# Patient Record
Sex: FEMALE | Race: BLACK OR AFRICAN AMERICAN | NOT HISPANIC OR LATINO | Employment: OTHER | URBAN - METROPOLITAN AREA
[De-identification: names, ages, dates, MRNs, and addresses within clinical notes are randomized per-mention and may not be internally consistent; named-entity substitution may affect disease eponyms.]

---

## 2025-01-09 ENCOUNTER — OFFICE VISIT (OUTPATIENT)
Dept: URGENT CARE | Age: 65
End: 2025-01-09
Payer: MEDICAID

## 2025-01-09 VITALS
DIASTOLIC BLOOD PRESSURE: 82 MMHG | TEMPERATURE: 97.6 F | WEIGHT: 149 LBS | SYSTOLIC BLOOD PRESSURE: 150 MMHG | RESPIRATION RATE: 18 BRPM | BODY MASS INDEX: 25.44 KG/M2 | HEIGHT: 64 IN | OXYGEN SATURATION: 96 % | HEART RATE: 60 BPM

## 2025-01-09 DIAGNOSIS — R30.0 DYSURIA: ICD-10-CM

## 2025-01-09 DIAGNOSIS — N30.01 ACUTE CYSTITIS WITH HEMATURIA: Primary | ICD-10-CM

## 2025-01-09 DIAGNOSIS — H65.192 OTHER NON-RECURRENT ACUTE NONSUPPURATIVE OTITIS MEDIA OF LEFT EAR: ICD-10-CM

## 2025-01-09 PROBLEM — I48.20 CHRONIC ATRIAL FIBRILLATION (MULTI): Status: ACTIVE | Noted: 2022-06-10

## 2025-01-09 PROBLEM — E78.5 HYPERLIPIDEMIA: Chronic | Status: ACTIVE | Noted: 2024-10-18

## 2025-01-09 PROBLEM — I69.30 HISTORY OF STROKE WITH RESIDUAL DEFICIT: Status: ACTIVE | Noted: 2022-06-10

## 2025-01-09 PROBLEM — Z86.718 HISTORY OF DVT (DEEP VEIN THROMBOSIS): Status: ACTIVE | Noted: 2017-04-07

## 2025-01-09 PROBLEM — I50.32 CHRONIC DIASTOLIC CHF (CONGESTIVE HEART FAILURE): Status: ACTIVE | Noted: 2021-03-11

## 2025-01-09 PROBLEM — J44.9 CHRONIC OBSTRUCTIVE PULMONARY DISEASE, UNSPECIFIED: Status: ACTIVE | Noted: 2024-08-04

## 2025-01-09 PROBLEM — Z86.79 HISTORY OF ATRIAL FIBRILLATION: Status: ACTIVE | Noted: 2023-07-26

## 2025-01-09 PROBLEM — N18.2 STAGE 2 CHRONIC KIDNEY DISEASE: Status: ACTIVE | Noted: 2024-07-17

## 2025-01-09 LAB
POC APPEARANCE, URINE: ABNORMAL
POC BILIRUBIN, URINE: NEGATIVE
POC BLOOD, URINE: ABNORMAL
POC COLOR, URINE: YELLOW
POC GLUCOSE, URINE: NEGATIVE MG/DL
POC KETONES, URINE: NEGATIVE MG/DL
POC LEUKOCYTES, URINE: ABNORMAL
POC NITRITE,URINE: POSITIVE
POC PH, URINE: 6 PH
POC PROTEIN, URINE: NEGATIVE MG/DL
POC SPECIFIC GRAVITY, URINE: 1.02
POC UROBILINOGEN, URINE: 0.2 EU/DL

## 2025-01-09 PROCEDURE — 87186 SC STD MICRODIL/AGAR DIL: CPT

## 2025-01-09 PROCEDURE — 87086 URINE CULTURE/COLONY COUNT: CPT

## 2025-01-09 RX ORDER — MONTELUKAST SODIUM 10 MG/1
1 TABLET ORAL NIGHTLY
COMMUNITY

## 2025-01-09 RX ORDER — ATORVASTATIN CALCIUM 80 MG/1
1 TABLET, FILM COATED ORAL DAILY
COMMUNITY

## 2025-01-09 RX ORDER — SERTRALINE HYDROCHLORIDE 100 MG/1
1 TABLET, FILM COATED ORAL DAILY
COMMUNITY

## 2025-01-09 RX ORDER — GABAPENTIN 600 MG/1
1 TABLET ORAL 3 TIMES DAILY
COMMUNITY

## 2025-01-09 RX ORDER — DIVALPROEX SODIUM 250 MG/1
1 TABLET, FILM COATED, EXTENDED RELEASE ORAL 2 TIMES DAILY
COMMUNITY

## 2025-01-09 RX ORDER — BUDESONIDE AND FORMOTEROL FUMARATE DIHYDRATE 160; 4.5 UG/1; UG/1
2 AEROSOL RESPIRATORY (INHALATION) 2 TIMES DAILY
COMMUNITY
Start: 2025-01-07

## 2025-01-09 RX ORDER — RIVAROXABAN 20 MG/1
20 TABLET, FILM COATED ORAL
COMMUNITY
Start: 2024-12-27

## 2025-01-09 RX ORDER — ASPIRIN 81 MG/1
81 TABLET ORAL
COMMUNITY
Start: 2024-07-31

## 2025-01-09 RX ORDER — AMOXICILLIN AND CLAVULANATE POTASSIUM 875; 125 MG/1; MG/1
875 TABLET, FILM COATED ORAL 2 TIMES DAILY
Qty: 20 TABLET | Refills: 0 | Status: SHIPPED | OUTPATIENT
Start: 2025-01-09 | End: 2025-01-19

## 2025-01-09 RX ORDER — GABAPENTIN 300 MG/1
1 CAPSULE ORAL
COMMUNITY
Start: 2024-12-19

## 2025-01-09 RX ORDER — FUROSEMIDE 20 MG/1
1 TABLET ORAL DAILY
COMMUNITY

## 2025-01-09 ASSESSMENT — PATIENT HEALTH QUESTIONNAIRE - PHQ9
1. LITTLE INTEREST OR PLEASURE IN DOING THINGS: NOT AT ALL
SUM OF ALL RESPONSES TO PHQ9 QUESTIONS 1 & 2: 0
2. FEELING DOWN, DEPRESSED OR HOPELESS: NOT AT ALL

## 2025-01-09 ASSESSMENT — ENCOUNTER SYMPTOMS
NAUSEA: 0
CHILLS: 0
BACK PAIN: 1
COUGH: 0
SHORTNESS OF BREATH: 0
VOMITING: 0
CONSTIPATION: 1
FLANK PAIN: 1
FEVER: 0
PALPITATIONS: 0
SORE THROAT: 0
DEPRESSION: 0
DYSURIA: 1

## 2025-01-09 NOTE — PROGRESS NOTES
"Subjective   Patient ID: Tamiko Holman is a 64 y.o. female. They present today with a chief complaint of Flank Pain (Left side /Started 5 days ago ) and Difficulty Urinating.    History of Present Illness  -c/o left back pain and dysuria for 5 days  -reports she has to \"push\" to urination and this causes her discomfort in her back, she had recent hospitalization at [facility] (12/19-12/21) where she verbalized this same complaint, and was evaluated by Urology.  Per urology consult  \"renal mass over past few years and they are felt to be benign.\" She had non infectious UA at the time.   -she denies fever, chills, nausea, vomiting, abdominal pain, hematuria  -she is on Xarelto  -she had numerous allergies, confirmed with patient she had allergy skin testing performed, and she is not allergic to penicillin.   -she also c/o left ear pain and difficulty hearing        Flank Pain  Associated symptoms: ear pain    Associated symptoms: no chest pain, no cough, no fever, no nausea, no shortness of breath, no sore throat and no vomiting    Difficulty Urinating  Associated symptoms: flank pain    Associated symptoms: no fever, no nausea and no vomiting        Past Medical History  Allergies as of 01/09/2025 - Reviewed 01/09/2025   Allergen Reaction Noted    Clarithromycin Hives, Rash, and Nausea/vomiting 07/18/2006    Histamine h2 inhibitors Other 06/24/2003    Meloxicam Swelling 11/08/2013    Aspirin Other 11/05/2018    Azithromycin Other 02/10/2003    Droperidol Other 07/02/2022    Naproxen GI Upset 11/05/2018    Penicillins Other 11/05/2018    Codeine GI Upset and Rash 02/10/2003    Histamine Rash 11/05/2018    Hydantoins Rash 06/24/2003    Ibuprofen Rash 11/05/2018    Ketorolac Swelling 12/02/2008    Metoclopramide Other and Rash 05/13/2005    Nsaids (non-steroidal anti-inflammatory drug) GI Upset 07/26/2023    Phenothiazines GI Upset, Nausea Only, and Rash 02/10/2003    Phenytoin Rash 02/10/2003    Rofecoxib Rash " "04/01/2003    Salicylates GI Upset 11/16/2010       (Not in a hospital admission)       No past medical history on file.    No past surgical history on file.     reports that she has been smoking cigarettes. She has never been exposed to tobacco smoke. She uses smokeless tobacco.    Review of Systems  Review of Systems   Constitutional:  Negative for chills and fever.   HENT:  Positive for ear pain and hearing loss. Negative for sore throat.    Respiratory:  Negative for cough and shortness of breath.    Cardiovascular:  Negative for chest pain and palpitations.   Gastrointestinal:  Positive for constipation. Negative for nausea and vomiting.   Genitourinary:  Positive for dysuria, flank pain and frequency. Negative for hematuria.        -CKD2   Musculoskeletal:  Positive for back pain.   Neurological:         -prior stroke  -parkinsons   All other systems reviewed and are negative.    Objective    Vitals:    01/09/25 1416   BP: 150/82   BP Location: Right arm   Patient Position: Sitting   Pulse: 60   Resp: 18   Temp: 36.4 °C (97.6 °F)   SpO2: 96%   Weight: 67.6 kg (149 lb)   Height: 1.626 m (5' 4\")     No LMP recorded. Patient is postmenopausal.    Physical Exam  Vitals reviewed.   Constitutional:       General: She is not in acute distress.     Appearance: Normal appearance. She is not ill-appearing.   HENT:      Head: Normocephalic and atraumatic.      Right Ear: Tympanic membrane and ear canal normal.      Left Ear: Ear canal normal. No decreased hearing noted. Tenderness present. A middle ear effusion is present. Tympanic membrane is erythematous. Tympanic membrane is not bulging. Tympanic membrane has normal mobility.   Cardiovascular:      Rate and Rhythm: Normal rate and regular rhythm.   Pulmonary:      Effort: Pulmonary effort is normal.      Comments: -diminished in the bases  -no significant wheezing, rhonchi or rales appreciated  Abdominal:      General: Bowel sounds are normal.      Palpations: Abdomen " is soft. There is no mass.      Tenderness: There is no abdominal tenderness. There is no right CVA tenderness, left CVA tenderness, guarding or rebound.      Comments: -mild suprapubic tenderness with palpation  -no distension noted    Musculoskeletal:      Lumbar back: No tenderness.      Comments: -c/o tenderness across lumbar spine b/l  -no muscle spasms appreciated  -no CVA tenderness appreciated    Neurological:      Mental Status: She is alert and oriented to person, place, and time.         Procedures    Point of Care Test & Imaging Results from this visit  Results for orders placed or performed in visit on 01/09/25   POCT UA Automated manually resulted   Result Value Ref Range    POC Color, Urine Yellow Straw, Yellow, Light-Yellow    POC Appearance, Urine Cloudy (A) Clear    POC Glucose, Urine NEGATIVE NEGATIVE mg/dl    POC Bilirubin, Urine NEGATIVE NEGATIVE    POC Ketones, Urine NEGATIVE NEGATIVE mg/dl    POC Specific Gravity, Urine 1.020 1.005 - 1.035    POC Blood, Urine SMALL (1+) (A) NEGATIVE    POC PH, Urine 6.0 No Reference Range Established PH    POC Protein, Urine NEGATIVE NEGATIVE mg/dl    POC Urobilinogen, Urine 0.2 0.2, 1.0 EU/DL    Poc Nitrite, Urine POSITIVE (A) NEGATIVE    POC Leukocytes, Urine SMALL (1+) (A) NEGATIVE      No results found.    Diagnostic study results (if any) were reviewed by Shira Shaffer PA-C.    Assessment/Plan   Allergies, medications, history, and pertinent labs/EKGs/Imaging reviewed by Shira Shaffer PA-C.     Medical Decision Making  Patient presented with urinary complaints and b/l back pain, which appears to be recurrent complaint.  No signs of pyelonephritis, sepsis, systemic illness, or vaginitis. She was recently hospitalized at [facility] (12/19-12/21/24) with similar complaints, and evaluated with CT and urology consult. She has b/l renal masses, which have been present for multiple years, and felt to be benign.  Today she UA shows 1+ luekest, +nitrites, +1  blood --> suggestive of UTI.  Urine pregnancy not indicated due to patients age.  Will send urine for culture and sensitivity.  Pt will be treated with Augmentin and contacted if urine culture demonstrates resistance to antibiotic selected for treatment.  She also has c/o left ear pain and decreased hearing, TM mildly erythematous which might indicated developing AOM.  She was started on Augmentin for UTI, which will provide coverage for possible otitis media.  She was advised to follow up with her PCP and urologist for persistent symptoms.  Risks, benefits, and alternatives of the medications and treatment plan prescribed today were discussed, and patient expressed understanding. Plan follow up as discussed or as needed if any worsening symptoms or change in condition. Reinforced red flags including (but not limited to): severe or worsening pain; difficulty swallowing; stiff neck; shortness of breath; coughing or vomiting blood; chest pain; and new or increased fever are indications to go to the Emergency Department.    At time of discharge patient was clinically well-appearing and HDS for outpatient management. The patient and/or family was educated regarding diagnosis, supportive care, OTC and Rx medications. The patient and/or family was given the opportunity to ask questions prior to discharge.  They verbalized understanding of my discussion of the plans for treatment, expected course, indications to return to  or seek further evaluation in ED, and the need for timely follow up as directed.   They were provided with a work/school excuse if requested.  AVS provided to patient.  All questions were answered and the patient verbalized understanding of the plan of care for today.          Orders and Diagnoses  Diagnoses and all orders for this visit:  Acute cystitis with hematuria  -     Urine Culture  -     amoxicillin-pot clavulanate (Augmentin) 875-125 mg tablet; Take 1 tablet (875 mg) by mouth 2 times a day  for 10 days.  -     Urine Culture  Dysuria  -     POCT UA Automated manually resulted  -     Urine Culture  Other non-recurrent acute nonsuppurative otitis media of left ear  -     amoxicillin-pot clavulanate (Augmentin) 875-125 mg tablet; Take 1 tablet (875 mg) by mouth 2 times a day for 10 days.  -     Urine Culture      Medical Admin Record      Patient disposition: Home    Electronically signed by Shira Shaffer PA-C  9:02 AM

## 2025-01-09 NOTE — PATIENT INSTRUCTIONS
You were diagnosed with urinary tract infection.     -You have been prescribed an antibiotic.  Please finish course of antibiotics, unless otherwise told by a provider.  -We will send your urine for culture. We will call you if your antibiotic doesn't treat the bacteria that grew in the culture.   -Take antibiotic with food, yogurt, or a probiotic. I recommend taking a probiotic while taking this medication, and for 7 days after you finish it.  A probiotic is a supplement you can buy over-the-counter that helps support the good bacteria in your body while taking antibiotics. Probiotics help to avoid the side effects of antibiotics, such as diarrhea or yeast infections. It is best to take a probiotic about 2 hours after your dose of antibiotic.   -If you do not feel relief in 24-36 hours, please return or call the clinic so we can change your antibiotic if necessary  -If your symptoms worsen, go to the emergency room for evaluation  -Phenazopyridine (available over the counter as AZO Standard or as a prescription, Pyridium) is for frequency, urgency and bladder spasm relief. It contains orange dye and will stain clothing so wear old underwear while taking. It also can cause nausea if not taken with food.    - Drink a lot of fluid, 3 to 4 quarts a day. Cranberry juice is especially recommended, in addition to large amounts of water.  - Avoid caffeine, tea and carbonated beverages (Coke®, 7-Up®, etc). They tend to irritate the bladder.  - Alcohol may irritate the prostate.  - Aspirin, ibuprofen (Advil® or Motrin®) or acetaminophen (Tylenol®) may be used for temperature and/or discomfort.  -Follow up with PCP within 1 week if symptoms worsen    TO PREVENT FURTHER INFECTIONS:  -Empty the bladder often. Avoid holding urine for long periods of time.  -After a bowel movement, women should cleanse from front to back. Use each tissue only once.  -Empty the bladder before and after sexual intercourse.  -If you develop back  pain, fever, nausea (feeling sick to your stomach), vomiting, or your symptoms (problems) are no better in 3 days, return to clinic. Return sooner if you are getting worse.  -You will be notified if your urine culture is positive.     SEEK FURTHER TREATMENT IF YOU:  -Develop severe back pain or lower abdominal pain.  -Unable to urinate.  -Develop chills and fever.  -Develop nausea or vomiting.  -Have continued burning or discomfort with urination.    You should follow up with your PC if you have persistent or recurring symptoms.

## 2025-01-10 ASSESSMENT — ENCOUNTER SYMPTOMS
HEMATURIA: 0
FREQUENCY: 1

## 2025-01-12 LAB — BACTERIA UR CULT: ABNORMAL

## 2025-03-29 ENCOUNTER — LAB REQUISITION (OUTPATIENT)
Dept: LAB | Facility: HOSPITAL | Age: 65
End: 2025-03-29
Payer: MEDICAID

## 2025-03-29 DIAGNOSIS — I50.9 HEART FAILURE, UNSPECIFIED: ICD-10-CM

## 2025-03-29 LAB
ALBUMIN SERPL BCP-MCNC: 4.1 G/DL (ref 3.4–5)
ALP SERPL-CCNC: 58 U/L (ref 33–136)
ALT SERPL W P-5'-P-CCNC: 14 U/L (ref 7–45)
ANION GAP SERPL CALC-SCNC: 12 MMOL/L (ref 10–20)
AST SERPL W P-5'-P-CCNC: 18 U/L (ref 9–39)
BILIRUB SERPL-MCNC: 0.5 MG/DL (ref 0–1.2)
BNP SERPL-MCNC: 231 PG/ML (ref 0–99)
BUN SERPL-MCNC: 14 MG/DL (ref 6–23)
CALCIUM SERPL-MCNC: 8.8 MG/DL (ref 8.6–10.3)
CHLORIDE SERPL-SCNC: 107 MMOL/L (ref 98–107)
CO2 SERPL-SCNC: 25 MMOL/L (ref 21–32)
CREAT SERPL-MCNC: 0.91 MG/DL (ref 0.5–1.05)
D DIMER PPP FEU-MCNC: 254 NG/ML FEU
EGFRCR SERPLBLD CKD-EPI 2021: 70 ML/MIN/1.73M*2
ERYTHROCYTE [DISTWIDTH] IN BLOOD BY AUTOMATED COUNT: 15.9 % (ref 11.5–14.5)
GLUCOSE SERPL-MCNC: 164 MG/DL (ref 74–99)
HCT VFR BLD AUTO: 32.7 % (ref 36–46)
HGB BLD-MCNC: 9.8 G/DL (ref 12–16)
MCH RBC QN AUTO: 28.1 PG (ref 26–34)
MCHC RBC AUTO-ENTMCNC: 30 G/DL (ref 32–36)
MCV RBC AUTO: 94 FL (ref 80–100)
NRBC BLD-RTO: 0 /100 WBCS (ref 0–0)
PLATELET # BLD AUTO: 139 X10*3/UL (ref 150–450)
POTASSIUM SERPL-SCNC: 3.7 MMOL/L (ref 3.5–5.3)
PROT SERPL-MCNC: 6.6 G/DL (ref 6.4–8.2)
RBC # BLD AUTO: 3.49 X10*6/UL (ref 4–5.2)
SODIUM SERPL-SCNC: 140 MMOL/L (ref 136–145)
WBC # BLD AUTO: 4.3 X10*3/UL (ref 4.4–11.3)

## 2025-03-29 PROCEDURE — 85379 FIBRIN DEGRADATION QUANT: CPT | Mod: OUT | Performed by: INTERNAL MEDICINE

## 2025-03-29 PROCEDURE — 85027 COMPLETE CBC AUTOMATED: CPT | Mod: OUT | Performed by: INTERNAL MEDICINE

## 2025-03-29 PROCEDURE — 84075 ASSAY ALKALINE PHOSPHATASE: CPT | Mod: OUT | Performed by: INTERNAL MEDICINE

## 2025-03-29 PROCEDURE — 83880 ASSAY OF NATRIURETIC PEPTIDE: CPT | Mod: OUT | Performed by: INTERNAL MEDICINE

## 2025-04-14 ENCOUNTER — ANCILLARY PROCEDURE (OUTPATIENT)
Dept: URGENT CARE | Age: 65
End: 2025-04-14
Payer: MEDICAID

## 2025-04-14 ENCOUNTER — APPOINTMENT (OUTPATIENT)
Dept: URGENT CARE | Age: 65
End: 2025-04-14
Payer: MEDICAID

## 2025-04-14 ENCOUNTER — OFFICE VISIT (OUTPATIENT)
Dept: URGENT CARE | Age: 65
End: 2025-04-14
Payer: MEDICAID

## 2025-04-14 VITALS
HEIGHT: 64 IN | HEART RATE: 76 BPM | SYSTOLIC BLOOD PRESSURE: 114 MMHG | DIASTOLIC BLOOD PRESSURE: 56 MMHG | WEIGHT: 145 LBS | BODY MASS INDEX: 24.75 KG/M2 | OXYGEN SATURATION: 99 % | RESPIRATION RATE: 16 BRPM

## 2025-04-14 DIAGNOSIS — R10.9 LEFT FLANK PAIN: ICD-10-CM

## 2025-04-14 DIAGNOSIS — N12 PYELONEPHRITIS: Primary | ICD-10-CM

## 2025-04-14 DIAGNOSIS — R05.1 ACUTE COUGH: ICD-10-CM

## 2025-04-14 LAB
POC APPEARANCE, URINE: ABNORMAL
POC BILIRUBIN, URINE: ABNORMAL
POC BLOOD, URINE: ABNORMAL
POC COLOR, URINE: ABNORMAL
POC GLUCOSE, URINE: ABNORMAL MG/DL
POC KETONES, URINE: ABNORMAL MG/DL
POC LEUKOCYTES, URINE: ABNORMAL
POC NITRITE,URINE: POSITIVE
POC PH, URINE: 6 PH
POC PROTEIN, URINE: ABNORMAL MG/DL
POC SPECIFIC GRAVITY, URINE: 1.02
POC UROBILINOGEN, URINE: 0.2 EU/DL

## 2025-04-14 PROCEDURE — 71046 X-RAY EXAM CHEST 2 VIEWS: CPT

## 2025-04-14 RX ORDER — SULFAMETHOXAZOLE AND TRIMETHOPRIM 800; 160 MG/1; MG/1
1 TABLET ORAL EVERY 12 HOURS
Qty: 20 TABLET | Refills: 0 | Status: SHIPPED | OUTPATIENT
Start: 2025-04-14 | End: 2025-04-24

## 2025-04-14 ASSESSMENT — ENCOUNTER SYMPTOMS
NAUSEA: 1
COUGH: 1
BLOOD IN STOOL: 0
NUMBNESS: 0
PALPITATIONS: 0
ABDOMINAL PAIN: 0
FLANK PAIN: 1
STRIDOR: 0
CHILLS: 1
FEVER: 1
WEAKNESS: 0
DYSURIA: 1
TROUBLE SWALLOWING: 0

## 2025-04-14 ASSESSMENT — PATIENT HEALTH QUESTIONNAIRE - PHQ9
2. FEELING DOWN, DEPRESSED OR HOPELESS: NOT AT ALL
1. LITTLE INTEREST OR PLEASURE IN DOING THINGS: NOT AT ALL
SUM OF ALL RESPONSES TO PHQ9 QUESTIONS 1 AND 2: 0

## 2025-04-14 NOTE — PROGRESS NOTES
Subjective   Patient ID: Tamiko Holman is a 64 y.o. female. They present today with a chief complaint of Other (Left flank pain x 2 days ) and Cough (Cough several weeks ).    HISTORY OF PRESENT ILLNESS:    Presents to the clinic for left flank pain, fevers/chills, slight nausea, and dysuria. States she initially began with urinary symptoms 2-3 days ago and they have worsened. Concern for a UTI. Additionally reports phlegmy cough for the past 1-2 weeks. Pneumonia vaccine received in 2023. She has been using Flonase and taking Robitussin.     Patient hospitalized within the last 1 month. In a wheelchair at baseline.     Past Medical History  Allergies as of 04/14/2025 - Reviewed 04/14/2025   Allergen Reaction Noted    Clarithromycin Hives, Rash, and Nausea/vomiting 07/18/2006    Histamine h2 inhibitors Other 06/24/2003    Meloxicam Swelling 11/08/2013    Aspirin Other 11/05/2018    Azithromycin Other 02/10/2003    Droperidol Other 07/02/2022    Naproxen GI Upset 11/05/2018    Penicillins Other 11/05/2018    Codeine GI Upset and Rash 02/10/2003    Histamine Rash 11/05/2018    Hydantoins Rash 06/24/2003    Ibuprofen Rash 11/05/2018    Ketorolac Swelling 12/02/2008    Metoclopramide Other and Rash 05/13/2005    Nsaids (non-steroidal anti-inflammatory drug) GI Upset 07/26/2023    Phenothiazines GI Upset, Nausea Only, and Rash 02/10/2003    Phenytoin Rash 02/10/2003    Rofecoxib Rash 04/01/2003    Salicylates GI Upset 11/16/2010       Current Outpatient Medications   Medication Instructions    aspirin 81 mg    atorvastatin (Lipitor) 80 mg tablet 1 tablet, oral, Daily    budesonide-formoteroL (Symbicort) 160-4.5 mcg/actuation inhaler 2 puffs, 2 times daily    divalproex (Depakote ER) 250 mg 24 hr tablet 1 tablet, oral, 2 times daily    furosemide (Lasix) 20 mg tablet 1 tablet, oral, Daily    gabapentin (Neurontin) 300 mg capsule 1 capsule    gabapentin (Neurontin) 600 mg tablet 1 tablet, oral, 3 times daily     "montelukast (Singulair) 10 mg tablet 1 tablet, oral, Nightly    sertraline (Zoloft) 100 mg tablet 1 tablet, oral, Daily    sulfamethoxazole-trimethoprim (Bactrim DS) 800-160 mg tablet 1 tablet, oral, Every 12 hours, Take with full glass of water.    Xarelto 20 mg, Daily with evening meal         No past medical history on file.    No past surgical history on file.     reports that she has been smoking cigarettes. She has never been exposed to tobacco smoke. She uses smokeless tobacco.    Review of Systems    Review of Systems   Constitutional:  Positive for chills and fever.   HENT:  Negative for drooling and trouble swallowing.    Respiratory:  Positive for cough. Negative for stridor.    Cardiovascular:  Negative for chest pain and palpitations.   Gastrointestinal:  Positive for nausea. Negative for abdominal pain and blood in stool.   Genitourinary:  Positive for dysuria and flank pain (Left).   Skin:  Negative for rash.   Neurological:  Negative for syncope, weakness and numbness.                                 Objective    Vitals:    04/14/25 1407   BP: 114/56   Pulse: 76   Resp: 16   SpO2: 99%   Weight: 65.8 kg (145 lb)   Height: 1.626 m (5' 4\")     No LMP recorded. Patient is postmenopausal.  PHYSICAL EXAMINATION:    Physical Exam  Vitals and nursing note reviewed.   Constitutional:       General: She is not in acute distress.     Appearance: She is not toxic-appearing or diaphoretic.      Comments: Seated comfortably in wheelchair.   HENT:      Head: Normocephalic and atraumatic.      Nose: Nose normal.   Eyes:      General:         Right eye: No discharge.         Left eye: No discharge.      Extraocular Movements: Extraocular movements intact.      Conjunctiva/sclera: Conjunctivae normal.   Cardiovascular:      Rate and Rhythm: Normal rate.   Pulmonary:      Effort: Tachypnea (mild) present. No accessory muscle usage or respiratory distress.      Breath sounds: Decreased breath sounds present.      " Comments: Slight coarse breath sounds auscultated in all lung fields, equal bilaterally. SpO2 99% on RA.  Abdominal:      General: There is no distension.      Tenderness: There is left CVA tenderness. There is no right CVA tenderness or guarding.   Musculoskeletal:      Cervical back: Normal range of motion and neck supple.   Skin:     General: Skin is warm and dry.   Neurological:      General: No focal deficit present.      Mental Status: She is alert and oriented to person, place, and time.      GCS: GCS eye subscore is 4. GCS verbal subscore is 5. GCS motor subscore is 6.   Psychiatric:         Mood and Affect: Mood normal.         Behavior: Behavior normal.          Procedures    Results for orders placed or performed in visit on 04/14/25   POCT UA Automated manually resulted   Result Value Ref Range    POC Color, Urine Dark Britt (A) Straw, Yellow, Light-Yellow    POC Appearance, Urine Cloudy (A) Clear    POC Glucose, Urine 250 (2+) (A) NEGATIVE mg/dl    POC Bilirubin, Urine SMALL (1+) (A) NEGATIVE    POC Ketones, Urine TRACE (A) NEGATIVE mg/dl    POC Specific Gravity, Urine 1.025 1.005 - 1.035    POC Blood, Urine TRACE-Intact (A) NEGATIVE    POC PH, Urine 6.0 No Reference Range Established PH    POC Protein, Urine 15 (1+) (A) NEGATIVE mg/dl    POC Urobilinogen, Urine 0.2 0.2, 1.0 EU/DL    Poc Nitrite, Urine POSITIVE (A) NEGATIVE    POC Leukocytes, Urine LARGE (3+) (A) NEGATIVE     === 04/14/25 ===    XR ABDOMEN 1 VIEW    - Impression -  Nonspecific, nonobstructive bowel gas pattern.    MACRO:  None.    Signed by: Diana Stroud 4/14/2025 3:40 PM  Dictation workstation:   [id number]     XR CHEST 2 VIEWS     No acute cardiopulmonary process radiographically.    Diagnostic study results (if any) were reviewed by Malika Lehman PA-C.    Assessment/Plan   Allergies, medications, history, and pertinent labs/EKGs/Imaging reviewed by Malika Lehman PA-C.     Orders and Diagnoses  Diagnoses and all orders for this  visit:  Pyelonephritis  -     cefTRIAXone (Rocephin) 1,000 mg in sterile water 4 mL injection  -     sulfamethoxazole-trimethoprim (Bactrim DS) 800-160 mg tablet; Take 1 tablet by mouth every 12 hours for 10 days. Take with full glass of water.  -     Urine Culture  Left flank pain  -     POCT UA Automated manually resulted  -     XR abdomen 1 view; Future  -     Urine Culture  Acute cough  -     XR chest 2 views; Future      Medical Admin Record    Given overall well appearance, vital signs, history, and exam as above, there is no indication for further emergent testing/intervention at this time.      I discussed with the patient my clinical thoughts at this time given the above and we had a shared decision-making conversation in a patient-centered decision-making model on how to proceed forward. Pt was instructed on the importance of close follow-up. They were told that an urgent care diagnosis is often a preliminary impression and that definitive care is often not able to be given in the urgent care setting. Pt was educated that close follow-up is essential for good health and good outcomes. Patient was provided with the following instructions:         Await urine cx. Begin oral abx as directed.       Daily Flonase. May take guaifenesin for mucus clearance if needed.     Cool mist humidifier in room at night while sleeping. Sleep in semi elevated position.    Tea with honey, throat lozenges, vapor rub, voice rest.     Tylenol for fevers/pain as needed.      Plenty of fluids and rest.      Follow up with PCP in 24-48 hrs.    Go to the ED sooner if needed for new or worsening sx.         Clinical impression as well as limitations of available testing/examination, all discussed with patient. Pt is well at this time in the urgent care. They are comfortable with the present assessment and plan to be discharged home. Discussed with them close outpatient follow up, reassessment, and possible further testing/treatment  via their PCP/specialist; they agree, understand, and are comfortable with this plan. Pt given the opportunity to ask questions prior to discharge and all questions were answered at this time. Via our discussion, the patient was advised of warning signs and instructions were reviewed. Strict ED precautions were given, acknowledged, and understood. Discussed with the patient/family that it is okay to return to the urgent care at any time for anything. Advised to present to the ED if present condition changes/worsens or if they develop new symptoms at any time after discharge. Also, advised to go to the ED if they cannot establish outpatient follow-up in time frame specified above. Pt verbalized understanding and agreement with plan and instructions. Discussed the need for close follow up with their primary care provider and/or specialist for further testing/treatment/care/consultation in the short time frame as noted above - they understand these instructions and agree to close follow up for these reasons. Patient discharged home in stable condition.      Follow Up Instructions  No follow-ups on file.    Patient disposition: Home    Electronically signed by Malika Lehman PA-C  4:13 PM

## 2025-04-17 LAB — BACTERIA UR CULT: ABNORMAL

## 2025-06-16 ENCOUNTER — OFFICE VISIT (OUTPATIENT)
Dept: URGENT CARE | Age: 65
End: 2025-06-16
Payer: MEDICAID

## 2025-06-16 VITALS
WEIGHT: 150 LBS | OXYGEN SATURATION: 96 % | SYSTOLIC BLOOD PRESSURE: 124 MMHG | DIASTOLIC BLOOD PRESSURE: 90 MMHG | HEIGHT: 64 IN | BODY MASS INDEX: 25.61 KG/M2 | RESPIRATION RATE: 20 BRPM | TEMPERATURE: 96.5 F | HEART RATE: 65 BPM

## 2025-06-16 DIAGNOSIS — L03.213 PERIORBITAL CELLULITIS OF LEFT EYE: Primary | ICD-10-CM

## 2025-06-16 RX ORDER — DOXYCYCLINE 100 MG/1
100 CAPSULE ORAL 2 TIMES DAILY
Qty: 20 CAPSULE | Refills: 0 | Status: SHIPPED | OUTPATIENT
Start: 2025-06-16 | End: 2025-06-26

## 2025-06-16 ASSESSMENT — PAIN SCALES - GENERAL: PAINLEVEL_OUTOF10: 9

## 2025-06-16 ASSESSMENT — VISUAL ACUITY: OU: 1

## 2025-06-16 NOTE — PROGRESS NOTES
"Subjective   Patient ID: Tamiko Holman is a 64 y.o. female who is her with her caretaker. She presents today with a chief complaint of Eye Problem (Patient left eye is red and irritated since yesterday ). Patient presents with a 2 day history of pain and redness under left eye. She denies visual changes. Left eye has felt sensitive to bright light today, but pain is improving today. She denies visual changes. No fevers, nasal congestion, ear pain or sinus pressure is reported.     History of Present Illness  HPI    Past Medical History  Allergies as of 06/16/2025 - Reviewed 06/16/2025   Allergen Reaction Noted    Clarithromycin Hives, Rash, and Nausea/vomiting 07/18/2006    Histamine h2 inhibitors Other 06/24/2003    Meloxicam Swelling 11/08/2013    Aspirin Other 11/05/2018    Azithromycin Other 02/10/2003    Droperidol Other 07/02/2022    Naproxen GI Upset 11/05/2018    Penicillins Other 11/05/2018    Codeine GI Upset and Rash 02/10/2003    Histamine Rash 11/05/2018    Hydantoins Rash 06/24/2003    Ibuprofen Rash 11/05/2018    Ketorolac Swelling 12/02/2008    Metoclopramide Other and Rash 05/13/2005    Nsaids (non-steroidal anti-inflammatory drug) GI Upset 07/26/2023    Phenothiazines GI Upset, Nausea Only, and Rash 02/10/2003    Phenytoin Rash 02/10/2003    Rofecoxib Rash 04/01/2003    Salicylates GI Upset 11/16/2010       Prescriptions Prior to Admission[1]     Medical History[2]    Surgical History[3]     reports that she has been smoking cigarettes. She has never been exposed to tobacco smoke. She uses smokeless tobacco.    Review of Systems  Review of Systems                               Objective    Vitals:    06/16/25 1111   BP: 124/90   Pulse: 65   Resp: 20   Temp: 35.8 °C (96.5 °F)   TempSrc: Oral   SpO2: 96%   Weight: 68 kg (150 lb)   Height: 1.626 m (5' 4\")     No LMP recorded. Patient is postmenopausal.    Physical Exam  Vitals and nursing note reviewed.   Constitutional:       General: She is not in " acute distress.     Appearance: Normal appearance. She is not ill-appearing or toxic-appearing.   HENT:      Right Ear: Tympanic membrane, ear canal and external ear normal.      Left Ear: Tympanic membrane, ear canal and external ear normal.      Nose: Nose normal.      Mouth/Throat:      Mouth: Mucous membranes are moist.      Pharynx: Oropharynx is clear.   Eyes:      General: Lids are normal. Lids are everted, no foreign bodies appreciated. Vision grossly intact. Gaze aligned appropriately. No visual field deficit or scleral icterus.        Right eye: No foreign body, discharge or hordeolum.         Left eye: No foreign body, discharge or hordeolum.      Extraocular Movements: Extraocular movements intact.      Right eye: Normal extraocular motion and no nystagmus.      Left eye: Normal extraocular motion and no nystagmus.      Conjunctiva/sclera: Conjunctivae normal.      Right eye: Right conjunctiva is not injected.      Left eye: Left conjunctiva is not injected.      Pupils: Pupils are equal, round, and reactive to light.      Comments: +tenderness to palpation inferior to left eye with mild associated erythema   Cardiovascular:      Rate and Rhythm: Normal rate and regular rhythm.   Musculoskeletal:      Cervical back: Normal range of motion and neck supple. No rigidity or tenderness.   Lymphadenopathy:      Cervical: No cervical adenopathy.   Neurological:      Mental Status: She is alert.         Procedures    Point of Care Test & Imaging Results from this visit  No results found for this visit on 06/16/25.   Imaging  No results found.    Cardiology, Vascular, and Other Imaging  No other imaging results found for the past 2 days      Diagnostic study results (if any) were reviewed by Mckenna Perez MD.    Assessment/Plan   Allergies, medications, history, and pertinent labs/EKGs/Imaging reviewed by Mckenna Perez MD.     Medical Decision Making      Orders and Diagnoses  There are no diagnoses linked to  this encounter.    Medical Admin Record      Patient disposition: Home    Electronically signed by Mckenna Perez MD  11:32 AM           [1] (Not in a hospital admission)  [2] History reviewed. No pertinent past medical history.  [3] History reviewed. No pertinent surgical history.

## 2025-06-16 NOTE — PATIENT INSTRUCTIONS
Antibiotics as directed; take with food  Cool compresses as directed  Tylenol as needed  Follow up with your Ophthalmologist in 3 to 5 days